# Patient Record
Sex: MALE | HISPANIC OR LATINO | Employment: PART TIME | URBAN - METROPOLITAN AREA
[De-identification: names, ages, dates, MRNs, and addresses within clinical notes are randomized per-mention and may not be internally consistent; named-entity substitution may affect disease eponyms.]

---

## 2024-01-25 ENCOUNTER — HOSPITAL ENCOUNTER (EMERGENCY)
Facility: CLINIC | Age: 29
Discharge: HOME | End: 2024-01-25
Admitting: PHYSICIAN ASSISTANT

## 2024-01-25 ENCOUNTER — APPOINTMENT (OUTPATIENT)
Dept: ULTRASOUND IMAGING | Facility: CLINIC | Age: 29
End: 2024-01-25

## 2024-01-25 VITALS
SYSTOLIC BLOOD PRESSURE: 130 MMHG | DIASTOLIC BLOOD PRESSURE: 85 MMHG | HEART RATE: 69 BPM | RESPIRATION RATE: 18 BRPM | OXYGEN SATURATION: 100 % | WEIGHT: 200 LBS | HEIGHT: 68 IN | BODY MASS INDEX: 30.31 KG/M2 | TEMPERATURE: 98 F

## 2024-01-25 DIAGNOSIS — K29.70 GASTRITIS: ICD-10-CM

## 2024-01-25 DIAGNOSIS — R10.84 GENERALIZED ABDOMINAL PAIN: ICD-10-CM

## 2024-01-25 LAB
ALBUMIN SERPL BCG-MCNC: 5.1 G/DL (ref 3.5–5.2)
ALBUMIN UR-MCNC: 30 MG/DL
ALP SERPL-CCNC: 72 U/L (ref 40–150)
ALT SERPL W P-5'-P-CCNC: 18 U/L (ref 0–70)
ANION GAP SERPL CALCULATED.3IONS-SCNC: 11 MMOL/L (ref 7–15)
APPEARANCE UR: CLEAR
AST SERPL W P-5'-P-CCNC: 24 U/L (ref 0–45)
BASOPHILS # BLD AUTO: 0 10E3/UL (ref 0–0.2)
BASOPHILS NFR BLD AUTO: 1 %
BILIRUB DIRECT SERPL-MCNC: <0.2 MG/DL (ref 0–0.3)
BILIRUB SERPL-MCNC: 0.7 MG/DL
BILIRUB UR QL STRIP: NEGATIVE
BUN SERPL-MCNC: 13.2 MG/DL (ref 6–20)
CALCIUM SERPL-MCNC: 9.9 MG/DL (ref 8.6–10)
CHLORIDE SERPL-SCNC: 105 MMOL/L (ref 98–107)
COLOR UR AUTO: YELLOW
CREAT SERPL-MCNC: 0.83 MG/DL (ref 0.67–1.17)
DEPRECATED HCO3 PLAS-SCNC: 27 MMOL/L (ref 22–29)
EGFRCR SERPLBLD CKD-EPI 2021: >90 ML/MIN/1.73M2
EOSINOPHIL # BLD AUTO: 0 10E3/UL (ref 0–0.7)
EOSINOPHIL NFR BLD AUTO: 0 %
ERYTHROCYTE [DISTWIDTH] IN BLOOD BY AUTOMATED COUNT: 11.4 % (ref 10–15)
FLUAV RNA SPEC QL NAA+PROBE: NEGATIVE
FLUBV RNA RESP QL NAA+PROBE: NEGATIVE
GLUCOSE SERPL-MCNC: 94 MG/DL (ref 70–99)
GLUCOSE UR STRIP-MCNC: NEGATIVE MG/DL
HCT VFR BLD AUTO: 43.8 % (ref 40–53)
HGB BLD-MCNC: 15.7 G/DL (ref 13.3–17.7)
HGB UR QL STRIP: NEGATIVE
IMM GRANULOCYTES # BLD: 0 10E3/UL
IMM GRANULOCYTES NFR BLD: 0 %
KETONES UR STRIP-MCNC: 20 MG/DL
LEUKOCYTE ESTERASE UR QL STRIP: NEGATIVE
LIPASE SERPL-CCNC: 34 U/L (ref 13–60)
LYMPHOCYTES # BLD AUTO: 1.9 10E3/UL (ref 0.8–5.3)
LYMPHOCYTES NFR BLD AUTO: 29 %
MCH RBC QN AUTO: 31.2 PG (ref 26.5–33)
MCHC RBC AUTO-ENTMCNC: 35.8 G/DL (ref 31.5–36.5)
MCV RBC AUTO: 87 FL (ref 78–100)
MONOCYTES # BLD AUTO: 0.5 10E3/UL (ref 0–1.3)
MONOCYTES NFR BLD AUTO: 8 %
MUCOUS THREADS #/AREA URNS LPF: PRESENT /LPF
NEUTROPHILS # BLD AUTO: 4.2 10E3/UL (ref 1.6–8.3)
NEUTROPHILS NFR BLD AUTO: 62 %
NITRATE UR QL: NEGATIVE
NRBC # BLD AUTO: 0 10E3/UL
NRBC BLD AUTO-RTO: 0 /100
PH UR STRIP: 6 [PH] (ref 5–7)
PLATELET # BLD AUTO: 195 10E3/UL (ref 150–450)
POTASSIUM SERPL-SCNC: 3.7 MMOL/L (ref 3.4–5.3)
PROT SERPL-MCNC: 7.8 G/DL (ref 6.4–8.3)
RBC # BLD AUTO: 5.03 10E6/UL (ref 4.4–5.9)
RBC URINE: 0 /HPF
RSV RNA SPEC NAA+PROBE: NEGATIVE
SARS-COV-2 RNA RESP QL NAA+PROBE: NEGATIVE
SODIUM SERPL-SCNC: 143 MMOL/L (ref 135–145)
SP GR UR STRIP: 1.03 (ref 1–1.03)
SPERM #/AREA URNS HPF: PRESENT /HPF
SQUAMOUS EPITHELIAL: <1 /HPF
UROBILINOGEN UR STRIP-MCNC: 2 MG/DL
WBC # BLD AUTO: 6.8 10E3/UL (ref 4–11)
WBC URINE: <1 /HPF

## 2024-01-25 PROCEDURE — 87637 SARSCOV2&INF A&B&RSV AMP PRB: CPT | Performed by: EMERGENCY MEDICINE

## 2024-01-25 PROCEDURE — 87491 CHLMYD TRACH DNA AMP PROBE: CPT | Performed by: PHYSICIAN ASSISTANT

## 2024-01-25 PROCEDURE — 36415 COLL VENOUS BLD VENIPUNCTURE: CPT | Performed by: PHYSICIAN ASSISTANT

## 2024-01-25 PROCEDURE — 99284 EMERGENCY DEPT VISIT MOD MDM: CPT | Mod: 25

## 2024-01-25 PROCEDURE — 83690 ASSAY OF LIPASE: CPT | Performed by: PHYSICIAN ASSISTANT

## 2024-01-25 PROCEDURE — 85025 COMPLETE CBC W/AUTO DIFF WBC: CPT | Performed by: PHYSICIAN ASSISTANT

## 2024-01-25 PROCEDURE — 81001 URINALYSIS AUTO W/SCOPE: CPT | Performed by: EMERGENCY MEDICINE

## 2024-01-25 PROCEDURE — 76870 US EXAM SCROTUM: CPT

## 2024-01-25 PROCEDURE — 82248 BILIRUBIN DIRECT: CPT | Performed by: PHYSICIAN ASSISTANT

## 2024-01-25 PROCEDURE — 80053 COMPREHEN METABOLIC PANEL: CPT | Performed by: PHYSICIAN ASSISTANT

## 2024-01-25 PROCEDURE — 250N000013 HC RX MED GY IP 250 OP 250 PS 637: Performed by: PHYSICIAN ASSISTANT

## 2024-01-25 RX ORDER — MAGNESIUM HYDROXIDE/ALUMINUM HYDROXICE/SIMETHICONE 120; 1200; 1200 MG/30ML; MG/30ML; MG/30ML
15 SUSPENSION ORAL ONCE
Status: COMPLETED | OUTPATIENT
Start: 2024-01-25 | End: 2024-01-25

## 2024-01-25 RX ADMIN — ALUMINUM HYDROXIDE, MAGNESIUM HYDROXIDE, AND SIMETHICONE 15 ML: 200; 200; 20 SUSPENSION ORAL at 18:29

## 2024-01-25 NOTE — ED TRIAGE NOTES
"PT is coming in tonight with testicle pain that started yesterday. He states that yesterday pain was 8/10 and today it is 5/10 with body aches, brain fog, body rash and feeling \"run down\". PT states that LT testicle is more swollen then RT.  No OTC medication PTA. Has not has intercourse in 3 years and does not feel like it could be an STI. PT states that he had no pain while urinating.      Triage Assessment (Adult)       Row Name 01/25/24 1634 01/25/24 1633       Triage Assessment    Airway WDL WDL WDL       Respiratory WDL    Respiratory WDL WDL --       Skin Circulation/Temperature WDL    Skin Circulation/Temperature WDL WDL --       Cardiac WDL    Cardiac WDL WDL --       Peripheral/Neurovascular WDL    Peripheral Neurovascular WDL WDL --       Cognitive/Neuro/Behavioral WDL    Cognitive/Neuro/Behavioral WDL WDL --                    "

## 2024-01-26 LAB
C TRACH DNA SPEC QL PROBE+SIG AMP: NEGATIVE
N GONORRHOEA DNA SPEC QL NAA+PROBE: NEGATIVE

## 2024-01-26 NOTE — ED PROVIDER NOTES
EMERGENCY DEPARTMENT ENCOUNTER      NAME: Darin Arriaga  AGE: 28 year old male  YOB: 1995  MRN: [id number]  EVALUATION DATE & TIME: No admission date for patient encounter.    PCP: No Ref-Primary, Physician    ED PROVIDER: Bennie Mendieta PA-C      Chief Complaint   Patient presents with    Groin Swelling         FINAL IMPRESSION:  1. Generalized abdominal pain    2. Gastritis          ED COURSE & MEDICAL DECISION MAKING:    Pertinent Labs & Imaging studies reviewed. (See chart for details)  6:00 PM I met the patient and performed my initial interview and exam.   7:30 PM Rechecked and updated the patient. We discussed the plan for discharge and the patient is agreeable. Reviewed supportive cares, symptomatic treatment, outpatient follow up, and reasons to return to the Emergency Department. Patient to be discharged by ED RN.       28 year old male presents to the Emergency Department for evaluation of diffuse abdominal discomfort, testicular pain, fatigue    ED Course as of 01/25/24 1935   Thu Jan 25, 2024 1724 Patient negative for COVID influenza and RSV.  Attempted to see the patient in the lobby, however he is currently at ultrasound.   1730 I have independently reviewed the ultrasound of the testicles, appears to have normal blood flow, normal epididymis to both sides, no hydrocele or varicocele.  Pending final radiology read.   1810 US Testicular & Scrotum w Doppler Ltd  Ultrasound of the testicles normal   1824 I was finally able to see the patient, he is complaining of multiple symptoms including generalized bodyaches, brain fog, as well as just feeling rundown.  Reportedly no sexual intercourse in the last 3 years, is not concerned for sexually transmitted infections.  Ultrasound here does not show any acute abnormalities.  He is primarily complaining of midepigastric abdominal pain without rebound or guarding.  Reportedly had a GI illness previously, did take some Maalox, this helped  with symptoms.  Abdomen is otherwise nontender here.  Will obtain basic labs here in the emergency department, seems more likely to be acute viral GI illness rather than cholecystitis, choledocholithiasis, pancreatitis.  Patient denies Significant alcohol use daily.  Otherwise denies any acute symptoms here in the emergency department, no pain with urination or burning.  No flank pain.  Examination here otherwise unremarkable.  Plan for basic labs here in the emergency department, ultrasound of the testicles given acute testicular pain, as well as COVID influenza and RSV swab.   1905 Lipase here normal, hepatic function normal, BMP normal.  Pending urinalysis.   1929 Urinalysis here unremarkable for any acute infections, there does appear to be some mucus in his urine, as well as spine present, however otherwise no acute abnormalities.   1934 Patient seen and reevaluated, no acute abnormalities on his lab work, or his exam here.  Unclear etiology, or does not seem to be intra hepatic, intra-abdominal, and testicular exam here is normal.  At this point, would forego any further antibiotic treatment, do not suspect is representative of Orchitis given his lack of reported sexual interaction, no rashes or lesions, GC testing pending, will call with results as needed.  Patient be discharged at this time.  Recommend that he start over-the-counter medication such as omeprazole for midepigastric discomfort       Medical Decision Making  Obtained supplemental history:Supplemental history obtained?: No  Reviewed external records: External records reviewed?: No  Care impacted by chronic illness:N/A  Care significantly affected by social determinants of health:N/A  Did you consider but not order tests?: Work up considered but not performed and documented in chart, if applicable  Did you interpret images independently?: Independent interpretation of ECG and images noted in documentation, when applicable.  Consultation discussion  "with other provider:Did you involve another provider (consultant, MH, pharmacy, etc.)?: No  Discharge. No recommendations on prescription strength medication(s). N/A.         At the conclusion of the encounter I discussed the results of all of the tests and the disposition. The questions were answered. The patient or family acknowledged understanding and was agreeable with the care plan.       0 minutes of critical care time     MEDICATIONS GIVEN IN THE EMERGENCY:  Medications   alum & mag hydroxide-simethicone (MAALOX) suspension 15 mL (15 mLs Oral $Given 1/25/24 9172)       NEW PRESCRIPTIONS STARTED AT TODAY'S ER VISIT  New Prescriptions    No medications on file          =================================================================    HPI    Patient information was obtained from: Patient     Use of : N/A        Darin Arriaga is a 28 year old male with no pertinent history on file who presents to this ED by walk in for evaluation of abdominal pain and testicular pain    Patient reports that yesterday he started having testicular pain and upper abdominal pain. He reports that his sisters were recently sick and patient also endorses a sore throat, myalgias, constipation. He also has been having increase in urination but denies dysuria. Patient has not had sexual intercourse in 2.5 years. He took Tums with no relief, he also took maalox with relief. He has been belching more. He also says he has brain fog and is sleepy.     PAST MEDICAL HISTORY:  No past medical history on file.    PAST SURGICAL HISTORY:  No past surgical history on file.        CURRENT MEDICATIONS:    No current outpatient medications on file.       ALLERGIES:  No Known Allergies    FAMILY HISTORY:  No family history on file.    SOCIAL HISTORY:   Social History     Socioeconomic History    Marital status: Single       VITALS:  BP (!) 162/98   Pulse 82   Temp 98  F (36.7  C) (Oral)   Resp 18   Ht 1.727 m (5' 8\")   Wt 90.7 kg (200 " lb)   BMI 30.41 kg/m      PHYSICAL EXAM    Physical Exam  Vitals and nursing note reviewed.   Constitutional:       General: He is not in acute distress.     Appearance: Normal appearance. He is normal weight. He is not toxic-appearing or diaphoretic.   HENT:      Right Ear: External ear normal.      Left Ear: External ear normal.   Eyes:      Conjunctiva/sclera: Conjunctivae normal.   Cardiovascular:      Rate and Rhythm: Normal rate and regular rhythm.      Pulses: Normal pulses.   Pulmonary:      Effort: Pulmonary effort is normal. No respiratory distress.      Breath sounds: No stridor. No wheezing or rales.   Abdominal:      General: Abdomen is flat.      Palpations: Abdomen is soft.      Tenderness: There is abdominal tenderness. There is no right CVA tenderness, left CVA tenderness, guarding or rebound.      Hernia: There is no hernia in the left inguinal area or right inguinal area.      Comments: Mild midepigastric abdominal tenderness, without rebound or guarding.   Genitourinary:     Penis: No erythema, discharge or lesions.       Testes:         Right: Tenderness present. Swelling not present. Right testis is descended.         Left: Tenderness or swelling not present. Left testis is descended.      Epididymis:      Right: Normal.      Left: Normal.   Neurological:      Mental Status: He is alert. Mental status is at baseline.         LAB:  All pertinent labs reviewed and interpreted.  Labs Ordered and Resulted from Time of ED Arrival to Time of ED Departure   ROUTINE UA WITH MICROSCOPIC REFLEX TO CULTURE - Abnormal       Result Value    Color Urine Yellow      Appearance Urine Clear      Glucose Urine Negative      Bilirubin Urine Negative      Ketones Urine 20 (*)     Specific Gravity Urine 1.027      Blood Urine Negative      pH Urine 6.0      Protein Albumin Urine 30 (*)     Urobilinogen Urine 2.0 (*)     Nitrite Urine Negative      Leukocyte Esterase Urine Negative      Mucus Urine Present (*)      Sperm Urine Present (*)     RBC Urine 0      WBC Urine <1      Squamous Epithelials Urine <1     INFLUENZA A/B, RSV, & SARS-COV2 PCR - Normal    Influenza A PCR Negative      Influenza B PCR Negative      RSV PCR Negative      SARS CoV2 PCR Negative     BASIC METABOLIC PANEL - Normal    Sodium 143      Potassium 3.7      Chloride 105      Carbon Dioxide (CO2) 27      Anion Gap 11      Urea Nitrogen 13.2      Creatinine 0.83      GFR Estimate >90      Calcium 9.9      Glucose 94     HEPATIC FUNCTION PANEL - Normal    Protein Total 7.8      Albumin 5.1      Bilirubin Total 0.7      Alkaline Phosphatase 72      AST 24      ALT 18      Bilirubin Direct <0.20     LIPASE - Normal    Lipase 34     CBC WITH PLATELETS AND DIFFERENTIAL    WBC Count 6.8      RBC Count 5.03      Hemoglobin 15.7      Hematocrit 43.8      MCV 87      MCH 31.2      MCHC 35.8      RDW 11.4      Platelet Count 195      % Neutrophils 62      % Lymphocytes 29      % Monocytes 8      % Eosinophils 0      % Basophils 1      % Immature Granulocytes 0      NRBCs per 100 WBC 0      Absolute Neutrophils 4.2      Absolute Lymphocytes 1.9      Absolute Monocytes 0.5      Absolute Eosinophils 0.0      Absolute Basophils 0.0      Absolute Immature Granulocytes 0.0      Absolute NRBCs 0.0     CHLAMYDIA TRACHOMATIS/NEISSERIA GONORRHOEAE BY PCR       RADIOLOGY:  Reviewed all pertinent imaging. Please see official radiology report.  US Testicular & Scrotum w Doppler Ltd   Final Result   IMPRESSION:      Normal scrotal ultrasound.        PROCEDURES:   None.       I, Philip Ambrose, am serving as a scribe to document services personally performed by Bennie Mendieta PA-C, based on my observation and the provider's statements to me. I, Bennie Mendieta PA-C, attest that Philip Ambrose is acting in a scribe capacity, has observed my performance of the services and has documented them in accordance with my direction.    Bennie Mendieta PA-C  Emergency  [facility] EMERGENCY ROOM  [address] [id number]  [phone]  Dept: [phone]       Bennie Mendieta PA-C  01/25/24 1936

## 2024-01-26 NOTE — DISCHARGE INSTRUCTIONS
You were seen here in the emergency department for evaluation of diffuse abdominal pain.  Your examination and workup here is unremarkable, your labs here do not show any acute abnormalities, your ultrasound of the testicles here is normal.  Labs for your liver, gallbladder, and pancreas are all normal.  I recommend ibuprofen and Tylenol over the next couple of days, as well as slowly increasing your diet at home.  Follow-up with your primary care doctor as needed.  Return to the emergency department if develop fevers or significant worsening pain.    For pain or fever you may use:  -Tylenol 650 mg every 6 hours.  Max 4000 mg in 24 hours  Do not use thismedication with alcohol as it can cause liver problems.  -Ibuprofen 600 mg every 6 hours.  Max 3500 mg in 24 hours  Do not take this medication if you have a history of a GI bleed or have kidney problems.  You may use both of these medications at the same time or you can alternate them every 3 hours.  For example, Tylenol at 6 AM, ibuprofen at 9 AM, Tylenol at noon, etc.